# Patient Record
Sex: FEMALE | Race: AMERICAN INDIAN OR ALASKA NATIVE
[De-identification: names, ages, dates, MRNs, and addresses within clinical notes are randomized per-mention and may not be internally consistent; named-entity substitution may affect disease eponyms.]

---

## 2017-08-28 ENCOUNTER — HOSPITAL ENCOUNTER (EMERGENCY)
Dept: HOSPITAL 5 - ED | Age: 22
Discharge: HOME | End: 2017-08-28
Payer: SELF-PAY

## 2017-08-28 VITALS — DIASTOLIC BLOOD PRESSURE: 70 MMHG | SYSTOLIC BLOOD PRESSURE: 122 MMHG

## 2017-08-28 DIAGNOSIS — O26.891: Primary | ICD-10-CM

## 2017-08-28 DIAGNOSIS — Z3A.01: ICD-10-CM

## 2017-08-28 DIAGNOSIS — R10.9: ICD-10-CM

## 2017-08-28 DIAGNOSIS — J45.909: ICD-10-CM

## 2017-08-28 LAB
ALBUMIN SERPL-MCNC: 4 G/DL (ref 3.9–5)
ALBUMIN/GLOB SERPL: 1.2 %
ALP SERPL-CCNC: 41 UNITS/L (ref 35–129)
ALT SERPL-CCNC: 24 UNITS/L (ref 7–56)
ANION GAP SERPL CALC-SCNC: 19 MMOL/L
BACTERIA #/AREA URNS HPF: (no result) /HPF
BASOPHILS NFR BLD AUTO: 0.5 % (ref 0–1.8)
BILIRUB SERPL-MCNC: 0.5 MG/DL (ref 0.1–1.2)
BILIRUB UR QL STRIP: (no result)
BLOOD UR QL VISUAL: (no result)
BUN SERPL-MCNC: 9 MG/DL (ref 7–17)
BUN/CREAT SERPL: 18 %
CALCIUM SERPL-MCNC: 8.7 MG/DL (ref 8.4–10.2)
CHLORIDE SERPL-SCNC: 101.9 MMOL/L (ref 98–107)
CO2 SERPL-SCNC: 21 MMOL/L (ref 22–30)
EOSINOPHIL NFR BLD AUTO: 5.8 % (ref 0–4.3)
GLUCOSE SERPL-MCNC: 109 MG/DL (ref 65–100)
HCT VFR BLD CALC: 39.4 % (ref 30.3–42.9)
HGB BLD-MCNC: 12.7 GM/DL (ref 10.1–14.3)
KETONES UR STRIP-MCNC: (no result) MG/DL
LEUKOCYTE ESTERASE UR QL STRIP: (no result)
MCH RBC QN AUTO: 29 PG (ref 28–32)
MCHC RBC AUTO-ENTMCNC: 32 % (ref 30–34)
MCV RBC AUTO: 91 FL (ref 79–97)
MUCOUS THREADS #/AREA URNS HPF: (no result) /HPF
NITRITE UR QL STRIP: (no result)
PH UR STRIP: 5 [PH] (ref 5–7)
PLATELET # BLD: 246 K/MM3 (ref 140–440)
POTASSIUM SERPL-SCNC: 3.6 MMOL/L (ref 3.6–5)
PROT SERPL-MCNC: 7.4 G/DL (ref 6.3–8.2)
PROT UR STRIP-MCNC: (no result) MG/DL
RBC # BLD AUTO: 4.35 M/MM3 (ref 3.65–5.03)
RBC #/AREA URNS HPF: 4 /HPF (ref 0–6)
SODIUM SERPL-SCNC: 138 MMOL/L (ref 137–145)
UROBILINOGEN UR-MCNC: < 2 MG/DL (ref ?–2)
WBC # BLD AUTO: 6 K/MM3 (ref 4.5–11)
WBC #/AREA URNS HPF: 2 /HPF (ref 0–6)

## 2017-08-28 PROCEDURE — 86900 BLOOD TYPING SEROLOGIC ABO: CPT

## 2017-08-28 PROCEDURE — 85025 COMPLETE CBC W/AUTO DIFF WBC: CPT

## 2017-08-28 PROCEDURE — 86901 BLOOD TYPING SEROLOGIC RH(D): CPT

## 2017-08-28 PROCEDURE — 76817 TRANSVAGINAL US OBSTETRIC: CPT

## 2017-08-28 PROCEDURE — 84702 CHORIONIC GONADOTROPIN TEST: CPT

## 2017-08-28 PROCEDURE — 36415 COLL VENOUS BLD VENIPUNCTURE: CPT

## 2017-08-28 PROCEDURE — 81001 URINALYSIS AUTO W/SCOPE: CPT

## 2017-08-28 PROCEDURE — 76801 OB US < 14 WKS SINGLE FETUS: CPT

## 2017-08-28 PROCEDURE — 80053 COMPREHEN METABOLIC PANEL: CPT

## 2017-08-28 NOTE — EMERGENCY DEPARTMENT REPORT
Chief Complaint: Abdominal Pain


Stated Complaint: UNK WKS PREG/ABD PAIN


Time Seen by Provider: 08/28/17 09:59





- HPI


History of Present Illness: 





PT states she had a positive home pregnancy test 3 weeks ago.  pt reports lower 

abd pain since yesterday 





- ROS


Review of Systems: 





+ abd pain 


- bleeding 





- Exam


Vital Signs: 


 Vital Signs











  08/28/17





  09:55


 


Temperature 99.1 F


 


Pulse Rate 81


 


Respiratory 20





Rate 


 


Blood Pressure 136/76


 


O2 Sat by Pulse 100





Oximetry 











MSE screening note: 


Focused history and physical exam performed.


Due to findings the following was ordered:





labs, us 





ED Disposition for MSE


Condition: Stable


Instructions:  Abdominal Pain (ED)

## 2017-08-28 NOTE — ULTRASOUND REPORT
ULTRASOUND OB LESS THAN 14 WEEKS FETUS

ULTRASOUND OB TRANSVAGINAL



HISTORY: Abdominal and pelvic pain. Beta-hCG level is 696.2.



FINDINGS: Transabdominal and transvaginal imaging was performed. The 

uterus measures 12 x 7 x 8 cm. No uterine mass is identified.



The endometrial stripe measures 16 mm. There appears to be a small 

amount of endometrial fluid. No intrauterine gestational sac or fetal 

heart tones could be demonstrated. The cervix is unremarkable.



The right ovary measures 1.9 x 1.3 x 1.9 cm. No focal abnormality.



The left ovary measures 2.2 x 2.1 x 2.5 cm. The left ovary contains a 

2.1 cm cystic lesion which probably represents a corpus luteum cyst.



IMPRESSION:

No intrauterine pregnancy is visualized at this time. See above.

Probable corpus luteum cyst in the left ovary. Please note that an 

ectopic pregnancy is not entirely excluded at this time. Followup is 

recommended.

## 2017-08-28 NOTE — EMERGENCY DEPARTMENT REPORT
ED Abdominal Pain HPI





- General


Chief Complaint: Abdominal Pain


Stated Complaint: UNK WKS PREG/ABD PAIN


Time Seen by Provider: 17 09:59


Source: patient


Mode of arrival: Ambulatory


Limitations: No Limitations





- History of Present Illness


Initial Comments: 





22-year-old female  here with abdominal cramping.  Patient's last miss her 

period was in early July.  She presents today with some cramping but no vaginal 

bleeding.  No fevers chills nausea vomiting.  She otherwise appears well.


MD Complaint: abdominal pain


-: Gradual


Location: suprapubic


Radiation: none


Migration to: no migration


Severity: moderate


Severity scale (0 -10): 0


Quality: fullness


Consistency: intermittent


Improves With: nothing


Worsens With: nothing


Associated Symptoms: denies: nausea, vomiting, chills, constipation, dysuria





- Related Data


LMP (females 10-50): pregnant


 Home Medications











 Medication  Instructions  Recorded  Confirmed  Last Taken


 


No Known Home Medications [No  16 Unknown





Reported Home Medications]    











 Allergies











Allergy/AdvReac Type Severity Reaction Status Date / Time


 


No Known Allergies Allergy   Verified 16 16:23














ED Review of Systems


ROS: 


Stated complaint: UNK WKS PREG/ABD PAIN


Other details as noted in HPI





Comment: All other systems reviewed and negative


Constitutional: denies: chills, fever


Eyes: denies: eye pain, eye discharge, vision change


ENT: denies: ear pain, throat pain


Respiratory: denies: cough, shortness of breath, wheezing


Cardiovascular: denies: chest pain, palpitations


Endocrine: no symptoms reported


Gastrointestinal: denies: abdominal pain, nausea, diarrhea


Genitourinary: denies: urgency, dysuria, discharge


Musculoskeletal: denies: back pain, joint swelling, arthralgia


Skin: denies: rash, lesions


Neurological: denies: headache, weakness, paresthesias


Psychiatric: denies: anxiety, depression


Hematological/Lymphatic: denies: easy bleeding, easy bruising





ED Past Medical Hx





- Past Medical History


Previous Medical History?: Yes


Hx Hypertension: No


Hx Diabetes: No


Hx Deep Vein Thrombosis: No


Hx Renal Disease: No


Hx Sickle Cell Disease: No


Hx Seizures: No


Hx Asthma: Yes (last attack 10yrs ago)


Hx HIV: No


Additional medical history: Vaginal dleivery x 3





- Surgical History


Past Surgical History?: No





- Family History


Family history: no significant





- Social History


Smoking Status: Never Smoker





- Medications


Home Medications: 


 Home Medications











 Medication  Instructions  Recorded  Confirmed  Last Taken  Type


 


No Known Home Medications [No  16 Unknown History





Reported Home Medications]     














ED Physical Exam





- General


Limitations: No Limitations


General appearance: alert, in no apparent distress, obese





- Head


Head exam: Present: atraumatic, normocephalic





- Eye


Eye exam: Present: normal appearance





- ENT


ENT exam: Present: mucous membranes moist





- Neck


Neck exam: Present: normal inspection





- Respiratory


Respiratory exam: Present: normal lung sounds bilaterally.  Absent: respiratory 

distress, wheezes, rales





- Cardiovascular


Cardiovascular Exam: Present: regular rate, normal rhythm, normal heart sounds.

  Absent: systolic murmur, diastolic murmur, rubs, gallop





- GI/Abdominal


GI/Abdominal exam: Present: soft, normal bowel sounds





- Extremities Exam


Extremities exam: Present: normal inspection





- Back Exam


Back exam: Present: normal inspection





- Neurological Exam


Neurological exam: Present: alert, oriented X3





- Psychiatric


Psychiatric exam: Present: normal affect, normal mood





- Skin


Skin exam: Present: warm, dry, intact, normal color.  Absent: rash





ED Course





 Vital Signs











  17





  09:55 13:40


 


Temperature 99.1 F 98.7 F


 


Pulse Rate 81 78


 


Respiratory 20 18





Rate  


 


Blood Pressure 136/76 


 


Blood Pressure  122/70





[Right]  


 


O2 Sat by Pulse 100 98





Oximetry  














ED Medical Decision Making





- Lab Data


Result diagrams: 


 17 10:02





 17 10:02








 Laboratory Results - last 24 hr











  17





  10:02 10:02 10:02


 


WBC  6.0  


 


RBC  4.35  


 


Hgb  12.7  


 


Hct  39.4  


 


MCV  91  


 


MCH  29  


 


MCHC  32  


 


RDW  16.7 H  


 


Plt Count  246  


 


Lymph % (Auto)  33.2  


 


Mono % (Auto)  7.7 H  


 


Eos % (Auto)  5.8 H  


 


Baso % (Auto)  0.5  


 


Lymph #  2.0  


 


Mono #  0.5  


 


Eos #  0.3  


 


Baso #  0.0  


 


Seg Neutrophils %  52.8  


 


Seg Neutrophils #  3.2  


 


Sodium   138 


 


Potassium   3.6 


 


Chloride   101.9 


 


Carbon Dioxide   21 L 


 


Anion Gap   19 


 


BUN   9 


 


Creatinine   0.5 L 


 


Estimated GFR   > 60 


 


BUN/Creatinine Ratio   18.00 


 


Glucose   109 H 


 


Calcium   8.7 


 


Total Bilirubin   0.50 


 


AST   23 


 


ALT   24 


 


Alkaline Phosphatase   41 


 


Total Protein   7.4 


 


Albumin   4.0 


 


Albumin/Globulin Ratio   1.2 


 


HCG, Quant    696.2 H


 


Urine Color   


 


Urine Turbidity   


 


Urine pH   


 


Ur Specific Gravity   


 


Urine Protein   


 


Urine Glucose (UA)   


 


Urine Ketones   


 


Urine Blood   


 


Urine Nitrite   


 


Urine Bilirubin   


 


Urine Urobilinogen   


 


Ur Leukocyte Esterase   


 


Urine WBC (Auto)   


 


Urine RBC (Auto)   


 


U Epithel Cells (Auto)   


 


Urine Bacteria (Auto)   


 


Urine Mucus   


 


Blood Type   














  17





  10:02 10:14


 


WBC  


 


RBC  


 


Hgb  


 


Hct  


 


MCV  


 


MCH  


 


MCHC  


 


RDW  


 


Plt Count  


 


Lymph % (Auto)  


 


Mono % (Auto)  


 


Eos % (Auto)  


 


Baso % (Auto)  


 


Lymph #  


 


Mono #  


 


Eos #  


 


Baso #  


 


Seg Neutrophils %  


 


Seg Neutrophils #  


 


Sodium  


 


Potassium  


 


Chloride  


 


Carbon Dioxide  


 


Anion Gap  


 


BUN  


 


Creatinine  


 


Estimated GFR  


 


BUN/Creatinine Ratio  


 


Glucose  


 


Calcium  


 


Total Bilirubin  


 


AST  


 


ALT  


 


Alkaline Phosphatase  


 


Total Protein  


 


Albumin  


 


Albumin/Globulin Ratio  


 


HCG, Quant  


 


Urine Color   Yellow


 


Urine Turbidity   Clear


 


Urine pH   5.0


 


Ur Specific Gravity   1.029


 


Urine Protein   <15 mg/dl


 


Urine Glucose (UA)   Neg


 


Urine Ketones   Neg


 


Urine Blood   Sm


 


Urine Nitrite   Neg


 


Urine Bilirubin   Neg


 


Urine Urobilinogen   < 2.0


 


Ur Leukocyte Esterase   Tr


 


Urine WBC (Auto)   2.0


 


Urine RBC (Auto)   4.0


 


U Epithel Cells (Auto)   5.0


 


Urine Bacteria (Auto)   1+


 


Urine Mucus   3+


 


Blood Type  AB POSITIVE 














- Medical Decision Making





22-year-old female here with abdominal cramping.  She is .  She's never 

had an ectopic pregnancy.  Her beta hCG is 696.  Her ultrasound does not show 

an intrauterine pregnancy.  She will need follow-up for repeat ultrasound or 

repeat beta-hCG in 48 hours.  I discussed this with the patient and she is 

comfortable with plan she is to return if she has worsening pain or sudden 

onset of vaginal bleeding.





Portions of this chart were dictated with dictation software.  There may be 

dictation errors contained within this note.


Critical care attestation.: 


If time is entered above; I have spent that time in minutes in the direct care 

of this critically ill patient, excluding procedure time.








ED Disposition


Clinical Impression: 


 Abdominal pain affecting pregnancy





Disposition: DC-01 TO HOME OR SELFCARE


Is pt being admited?: No


Condition: Stable


Instructions:  Abdominal Pain (ED), Pregnancy (ED), Ectopic Pregnancy (ED)


Additional Instructions: 


Please return to the emergency department for repeat ultrasound and repeat beta 

hCG in 48 hours.  If you would prefern he may follow directly with a 

gynecologist.


Referrals: 


PRIMARY CARE,MD [Primary Care Provider] - 3-5 Days


JOSH OLIVER MD [Staff Physician] - ASAP (Follow up in 48 hours)

## 2017-10-09 ENCOUNTER — HOSPITAL ENCOUNTER (EMERGENCY)
Dept: HOSPITAL 5 - ED | Age: 22
LOS: 1 days | Discharge: HOME | End: 2017-10-10
Payer: SELF-PAY

## 2017-10-09 DIAGNOSIS — F17.210: ICD-10-CM

## 2017-10-09 DIAGNOSIS — Z3A.10: ICD-10-CM

## 2017-10-09 DIAGNOSIS — O46.91: Primary | ICD-10-CM

## 2017-10-09 LAB
ALBUMIN SERPL-MCNC: 4 G/DL (ref 3.9–5)
ALBUMIN/GLOB SERPL: 1.2 %
ALP SERPL-CCNC: 35 UNITS/L (ref 35–129)
ALT SERPL-CCNC: 15 UNITS/L (ref 7–56)
ANION GAP SERPL CALC-SCNC: 18 MMOL/L
BASOPHILS NFR BLD AUTO: 0.2 % (ref 0–1.8)
BILIRUB SERPL-MCNC: 0.2 MG/DL (ref 0.1–1.2)
BILIRUB UR QL STRIP: (no result)
BLOOD UR QL VISUAL: (no result)
BUN SERPL-MCNC: 9 MG/DL (ref 7–17)
BUN/CREAT SERPL: 18 %
CALCIUM SERPL-MCNC: 9.3 MG/DL (ref 8.4–10.2)
CHLORIDE SERPL-SCNC: 100.2 MMOL/L (ref 98–107)
CO2 SERPL-SCNC: 20 MMOL/L (ref 22–30)
EOSINOPHIL NFR BLD AUTO: 3.4 % (ref 0–4.3)
GLUCOSE SERPL-MCNC: 108 MG/DL (ref 65–100)
HCT VFR BLD CALC: 37.4 % (ref 30.3–42.9)
HGB BLD-MCNC: 12.6 GM/DL (ref 10.1–14.3)
KETONES UR STRIP-MCNC: (no result) MG/DL
LEUKOCYTE ESTERASE UR QL STRIP: (no result)
LIPASE SERPL-CCNC: 23 UNITS/L (ref 13–60)
MCH RBC QN AUTO: 30 PG (ref 28–32)
MCHC RBC AUTO-ENTMCNC: 34 % (ref 30–34)
MCV RBC AUTO: 90 FL (ref 79–97)
MUCOUS THREADS #/AREA URNS HPF: (no result) /HPF
NITRITE UR QL STRIP: (no result)
PH UR STRIP: 8 [PH] (ref 5–7)
PLATELET # BLD: 240 K/MM3 (ref 140–440)
POTASSIUM SERPL-SCNC: 3.6 MMOL/L (ref 3.6–5)
PROT SERPL-MCNC: 7.3 G/DL (ref 6.3–8.2)
PROT UR STRIP-MCNC: (no result) MG/DL
RBC # BLD AUTO: 4.18 M/MM3 (ref 3.65–5.03)
RBC #/AREA URNS HPF: 2 /HPF (ref 0–6)
SODIUM SERPL-SCNC: 135 MMOL/L (ref 137–145)
UROBILINOGEN UR-MCNC: < 2 MG/DL (ref ?–2)
WBC # BLD AUTO: 8 K/MM3 (ref 4.5–11)
WBC #/AREA URNS HPF: < 1 /HPF (ref 0–6)

## 2017-10-09 PROCEDURE — 81001 URINALYSIS AUTO W/SCOPE: CPT

## 2017-10-09 PROCEDURE — 80053 COMPREHEN METABOLIC PANEL: CPT

## 2017-10-09 PROCEDURE — 36415 COLL VENOUS BLD VENIPUNCTURE: CPT

## 2017-10-09 PROCEDURE — 99284 EMERGENCY DEPT VISIT MOD MDM: CPT

## 2017-10-09 PROCEDURE — 76801 OB US < 14 WKS SINGLE FETUS: CPT

## 2017-10-09 PROCEDURE — 84703 CHORIONIC GONADOTROPIN ASSAY: CPT

## 2017-10-09 PROCEDURE — 83690 ASSAY OF LIPASE: CPT

## 2017-10-09 PROCEDURE — 85025 COMPLETE CBC W/AUTO DIFF WBC: CPT

## 2017-10-09 PROCEDURE — 84702 CHORIONIC GONADOTROPIN TEST: CPT

## 2017-10-09 NOTE — ULTRASOUND REPORT
FINAL REPORT



PROCEDURE:  US OB \T\lt; = 14 WEEKS FETUS



TECHNIQUE:  Real-time transabdominal sonography of the uterus,

placenta, amniotic fluid, adnexa, and fetus was performed with

image documentation. Measurements were obtained to determine

fetal age/size. M-mode Doppler was used to document fetal

heartbeat. CPT 48391 



HISTORY:  ABD CRAMPING 



COMPARISON:  No prior studies are available for comparison.



FINDINGS:  

CRL: 36 mm, which corresponds to a gestational age of: 10 weeks,

3 days. 



Yolk Sac: Normal.



Embryonic Cardiac Activity: 166 beats per minute



Gestational Sac: 2 small crescentic hypoechoic areas are seen

adjacent to the gestational sac, likely related to subchorionic

hemorrhage. One is seen inferiorly which measures 1.1 x 1.7 x 2.2

centimeters. One is seen posteriorly which measures 1.7 x 0.5 x

1.0 centimeters



Amniotic fluid: Normal.



Cervix: Not well-visualized



Right Ovary: Normal.



Left Ovary: 2.3 centimeter complex cyst is present



Estimated delivery date: 05/04/2018



IMPRESSION:  

Single live intrauterine gestation at approximately 10 weeks 3

days. EDC by US 05/04/2018 



Two small areas of subchorionic hemorrhage are seen, as described

above.

## 2017-10-10 VITALS — SYSTOLIC BLOOD PRESSURE: 118 MMHG | DIASTOLIC BLOOD PRESSURE: 68 MMHG

## 2017-10-10 NOTE — EMERGENCY DEPARTMENT REPORT
HPI





- General


Chief Complaint: Abdominal Pain


Time Seen by Provider: 10/10/17 08:04





- HPI


HPI: 





Room 26





The patient is a 22-year-old female presented with a chief complaint of 

abdominal pain.  Patient states she is pregnant but has not yet seen an OB/GYN.

  Patient states for the past 3 days she's had intermittent suprapubic 

abdominal pain.  Patient denies vaginal bleeding or dysuria.  Patient describes 

pain as cramping and intermittent.





Location: Suprapubic


Duration: 3 days


Quality: Cramping


Severity: Moderate


Modifying factors: [see above]


Context: [see above]


Mode of transportation: Unknown





ED Past Medical Hx





- Past Medical History


Hx Asthma: Yes (last attack 10yrs ago)


Additional medical history: Vaginal dleivery x 3





- Surgical History


Past Surgical History?: No





- Family History


Family history: no significant





- Social History


Smoking Status: Current Some Day Smoker


Substance Use Type: None (denies illicit drug use)





- Medications


Home Medications: 


 Home Medications











 Medication  Instructions  Recorded  Confirmed  Last Taken  Type


 


No Known Home Medications [No  16 Unknown History





Reported Home Medications]     














ED Review of Systems


ROS: 


Stated complaint: ABD PAIN 15WKS


Other details as noted in HPI





Comment: All other systems reviewed and negative


Constitutional: denies: chills, fever


Eyes: denies: eye pain, eye discharge, vision change


ENT: denies: ear pain, throat pain


Cardiovascular: denies: chest pain, palpitations


Endocrine: no symptoms reported


Gastrointestinal: abdominal pain


Genitourinary: denies: dysuria, abnormal menses


Musculoskeletal: back pain


Skin: denies: rash, lesions


Neurological: denies: headache, weakness, paresthesias


Psychiatric: denies: anxiety, depression


Hematological/Lymphatic: denies: easy bleeding, easy bruising





Physical Exam





- Physical Exam


Vital Signs: 


 Vital Signs











  10/09/17 10/09/17 10/10/17





  17:28 20:30 08:00


 


Temperature 98.5 F  


 


Pulse Rate 93 H  87


 


Respiratory 18 18 16





Rate   


 


Blood Pressure 123/65  


 


Blood Pressure   118/68





[Left]   


 


O2 Sat by Pulse 99  98





Oximetry   











Physical Exam: 





GENERAL: The patient is well-developed well-nourished female lying on stretcher 

not appearing to be in acute distress. []


HEENT: Normocephalic.  Atraumatic.  Extraocular motions are intact.  Patient 

has moist mucous membranes.


NECK: Supple.  No meningitic signs are noted.  There is no adenopathy noted.


CHEST/LUNGS: Clear to auscultation.  There is no respiratory distress noted.


HEART/CARDIOVASCULAR: Regular.  There is no tachycardia.  There is no gallop 

rub or murmur.


ABDOMEN: Abdomen is soft, with mild discomfort to palpation in the suprapubic 

and left lower quadrant.  There is no tenderness to palpation in the right 

lower quadrant.  There is no rebound or guarding.  Patient has normal bowel 

sounds.  There is no abdominal distention.


SKIN: There is no rash.  There is no edema.  There is no diaphoresis.


NEURO: The patient is awake, alert, and oriented.  The patient is cooperative.  

The patient has normal speech


MUSCULOSKELETAL:   There is no evidence of acute injury.





ED Course


 Vital Signs











  10/09/17 10/09/17 10/10/17





  17:28 20:30 08:00


 


Temperature 98.5 F  


 


Pulse Rate 93 H  87


 


Respiratory 18 18 16





Rate   


 


Blood Pressure 123/65  


 


Blood Pressure   118/68





[Left]   


 


O2 Sat by Pulse 99  98





Oximetry   














ED Medical Decision Making





- Lab Data


Result diagrams: 


 10/09/17 17:40





 10/09/17 17:40





 Laboratory Tests











  10/09/17 10/09/17 10/09/17





  17:40 17:40 17:40


 


WBC  8.0  


 


RBC  4.18  


 


Hgb  12.6  


 


Hct  37.4  


 


MCV  90  


 


MCH  30  


 


MCHC  34  


 


RDW  15.6 H  


 


Plt Count  240  


 


Lymph % (Auto)  27.4  


 


Mono % (Auto)  5.0  


 


Eos % (Auto)  3.4  


 


Baso % (Auto)  0.2  


 


Lymph #  2.2  


 


Mono #  0.4  


 


Eos #  0.3  


 


Baso #  0.0  


 


Seg Neutrophils %  64.0  


 


Seg Neutrophils #  5.1  


 


Sodium   135 L 


 


Potassium   3.6 


 


Chloride   100.2 


 


Carbon Dioxide   20 L 


 


Anion Gap   18 


 


BUN   9 


 


Creatinine   0.5 L 


 


Estimated GFR   > 60 


 


BUN/Creatinine Ratio   18 


 


Glucose   108 H 


 


Calcium   9.3 


 


Total Bilirubin   0.20 


 


AST   14 


 


ALT   15 


 


Alkaline Phosphatase   35 


 


Total Protein   7.3 


 


Albumin   4.0 


 


Albumin/Globulin Ratio   1.2 


 


Lipase   23 


 


HCG, Qual    Positive


 


HCG, Quant   


 


Urine Color   


 


Urine Turbidity   


 


Urine pH   


 


Ur Specific Gravity   


 


Urine Protein   


 


Urine Glucose (UA)   


 


Urine Ketones   


 


Urine Blood   


 


Urine Nitrite   


 


Urine Bilirubin   


 


Urine Urobilinogen   


 


Ur Leukocyte Esterase   


 


Urine WBC (Auto)   


 


Urine RBC (Auto)   


 


U Epithel Cells (Auto)   


 


Urine Mucus   














  10/09/17 10/09/17





  17:40 18:06


 


WBC  


 


RBC  


 


Hgb  


 


Hct  


 


MCV  


 


MCH  


 


MCHC  


 


RDW  


 


Plt Count  


 


Lymph % (Auto)  


 


Mono % (Auto)  


 


Eos % (Auto)  


 


Baso % (Auto)  


 


Lymph #  


 


Mono #  


 


Eos #  


 


Baso #  


 


Seg Neutrophils %  


 


Seg Neutrophils #  


 


Sodium  


 


Potassium  


 


Chloride  


 


Carbon Dioxide  


 


Anion Gap  


 


BUN  


 


Creatinine  


 


Estimated GFR  


 


BUN/Creatinine Ratio  


 


Glucose  


 


Calcium  


 


Total Bilirubin  


 


AST  


 


ALT  


 


Alkaline Phosphatase  


 


Total Protein  


 


Albumin  


 


Albumin/Globulin Ratio  


 


Lipase  


 


HCG, Qual  


 


HCG, Quant  240171 H 


 


Urine Color   Yellow


 


Urine Turbidity   Clear


 


Urine pH   8.0 H


 


Ur Specific Gravity   1.025


 


Urine Protein   <15 mg/dl


 


Urine Glucose (UA)   Neg


 


Urine Ketones   Neg


 


Urine Blood   Neg


 


Urine Nitrite   Neg


 


Urine Bilirubin   Neg


 


Urine Urobilinogen   < 2.0


 


Ur Leukocyte Esterase   Neg


 


Urine WBC (Auto)   < 1.0


 


Urine RBC (Auto)   2.0


 


U Epithel Cells (Auto)   2.0


 


Urine Mucus   Few














- Radiology Data


Radiology results: report reviewed (pelvic ultrasound), image reviewed (pelvic 

ultrasound)





Pelvic ultrasound (read by radiologist)-single live intrauterine gestation at 

approximately 10 weeks 3 days.  2 small areas of subchorionic hemorrhage are 

seen





- Differential Diagnosis


threatened , ectopic pregnancy, UTI


Critical care attestation.: 


If time is entered above; I have spent that time in minutes in the direct care 

of this critically ill patient, excluding procedure time.








ED Disposition


Clinical Impression: 


 Subchorionic hemorrhage in first trimester, Abdominal pain





Disposition:  TO HOME OR SELFCARE


Is pt being admited?: No


Does the pt Need Aspirin: No


Condition: Stable


Instructions:  Abdominal Pain (ED)


Additional Instructions: 


Return to the emergency department immediately should you develop worsening 

symptoms, fever, inability to tolerate food or liquid or any other concerns.


Referrals: 


PRIMARY CARE,MD [Primary Care Provider] - 3-5 Days


your, OB/GYN [Other] - ASAP


Time of Disposition: 08:16

## 2017-12-26 ENCOUNTER — HOSPITAL ENCOUNTER (OUTPATIENT)
Dept: HOSPITAL 5 - TRG | Age: 22
Discharge: HOME | End: 2017-12-26
Attending: OBSTETRICS & GYNECOLOGY
Payer: COMMERCIAL

## 2017-12-26 VITALS — SYSTOLIC BLOOD PRESSURE: 138 MMHG | DIASTOLIC BLOOD PRESSURE: 75 MMHG

## 2017-12-26 DIAGNOSIS — Z3A.23: ICD-10-CM

## 2017-12-26 DIAGNOSIS — Z87.891: ICD-10-CM

## 2017-12-26 DIAGNOSIS — O47.02: Primary | ICD-10-CM

## 2017-12-26 LAB
BILIRUB UR QL STRIP: (no result)
BLOOD UR QL VISUAL: (no result)
KETONES UR STRIP-MCNC: (no result) MG/DL
LEUKOCYTE ESTERASE UR QL STRIP: (no result)
MUCOUS THREADS #/AREA URNS HPF: (no result) /HPF
NITRITE UR QL STRIP: (no result)
PH UR STRIP: 7 [PH] (ref 5–7)
PROT UR STRIP-MCNC: (no result) MG/DL
RBC #/AREA URNS HPF: 2 /HPF (ref 0–6)
UROBILINOGEN UR-MCNC: < 2 MG/DL (ref ?–2)
WBC #/AREA URNS HPF: 1 /HPF (ref 0–6)

## 2017-12-26 PROCEDURE — 81001 URINALYSIS AUTO W/SCOPE: CPT

## 2017-12-26 PROCEDURE — 59025 FETAL NON-STRESS TEST: CPT

## 2017-12-26 PROCEDURE — 96360 HYDRATION IV INFUSION INIT: CPT

## 2017-12-26 PROCEDURE — 96372 THER/PROPH/DIAG INJ SC/IM: CPT

## 2018-03-08 ENCOUNTER — HOSPITAL ENCOUNTER (OUTPATIENT)
Dept: HOSPITAL 5 - TRG | Age: 23
Discharge: HOME | End: 2018-03-08
Attending: OBSTETRICS & GYNECOLOGY
Payer: MEDICAID

## 2018-03-08 VITALS — DIASTOLIC BLOOD PRESSURE: 71 MMHG | SYSTOLIC BLOOD PRESSURE: 129 MMHG

## 2018-03-08 DIAGNOSIS — Z3A.36: ICD-10-CM

## 2018-03-08 DIAGNOSIS — O47.03: Primary | ICD-10-CM

## 2018-03-08 DIAGNOSIS — Z79.899: ICD-10-CM

## 2018-03-08 LAB
BENZODIAZEPINES SCREEN,URINE: (no result)
BILIRUB UR QL STRIP: (no result)
BLOOD UR QL VISUAL: (no result)
METHADONE SCREEN,URINE: (no result)
MUCOUS THREADS #/AREA URNS HPF: (no result) /HPF
OPIATE SCREEN,URINE: (no result)
PH UR STRIP: 6 [PH] (ref 5–7)
PROT UR STRIP-MCNC: (no result) MG/DL
RBC #/AREA URNS HPF: 5 /HPF (ref 0–6)
UROBILINOGEN UR-MCNC: < 2 MG/DL (ref ?–2)
WBC #/AREA URNS HPF: 5 /HPF (ref 0–6)

## 2018-03-08 PROCEDURE — 81001 URINALYSIS AUTO W/SCOPE: CPT

## 2018-03-08 PROCEDURE — 59025 FETAL NON-STRESS TEST: CPT

## 2018-03-08 PROCEDURE — 80307 DRUG TEST PRSMV CHEM ANLYZR: CPT

## 2018-04-14 ENCOUNTER — HOSPITAL ENCOUNTER (OUTPATIENT)
Dept: HOSPITAL 5 - TRG | Age: 23
LOS: 1 days | Discharge: HOME | End: 2018-04-15
Attending: OBSTETRICS & GYNECOLOGY
Payer: MEDICAID

## 2018-04-14 VITALS — SYSTOLIC BLOOD PRESSURE: 112 MMHG | DIASTOLIC BLOOD PRESSURE: 60 MMHG

## 2018-04-14 DIAGNOSIS — O99.333: Primary | ICD-10-CM

## 2018-04-14 DIAGNOSIS — O47.1: ICD-10-CM

## 2018-04-14 DIAGNOSIS — F17.200: ICD-10-CM

## 2018-04-14 DIAGNOSIS — Z3A.39: ICD-10-CM

## 2018-04-14 PROCEDURE — 59025 FETAL NON-STRESS TEST: CPT

## 2018-04-23 ENCOUNTER — HOSPITAL ENCOUNTER (INPATIENT)
Dept: HOSPITAL 5 - TRG | Age: 23
LOS: 2 days | Discharge: HOME | End: 2018-04-25
Attending: OBSTETRICS & GYNECOLOGY | Admitting: OBSTETRICS & GYNECOLOGY
Payer: MEDICAID

## 2018-04-23 DIAGNOSIS — Z3A.40: ICD-10-CM

## 2018-04-23 DIAGNOSIS — J45.909: ICD-10-CM

## 2018-04-23 LAB
HCT VFR BLD CALC: 26.7 % (ref 30.3–42.9)
HCT VFR BLD CALC: 31 % (ref 30.3–42.9)
HGB BLD-MCNC: 10.1 GM/DL (ref 10.1–14.3)
HGB BLD-MCNC: 8.6 GM/DL (ref 10.1–14.3)
MCH RBC QN AUTO: 27 PG (ref 28–32)
MCHC RBC AUTO-ENTMCNC: 33 % (ref 30–34)
MCV RBC AUTO: 83 FL (ref 79–97)
PLATELET # BLD: 230 K/MM3 (ref 140–440)
RBC # BLD AUTO: 3.76 M/MM3 (ref 3.65–5.03)

## 2018-04-23 PROCEDURE — 99211 OFF/OP EST MAY X REQ PHY/QHP: CPT

## 2018-04-23 PROCEDURE — 85027 COMPLETE CBC AUTOMATED: CPT

## 2018-04-23 PROCEDURE — 85018 HEMOGLOBIN: CPT

## 2018-04-23 PROCEDURE — G0463 HOSPITAL OUTPT CLINIC VISIT: HCPCS

## 2018-04-23 PROCEDURE — 3E0R3BZ INTRODUCTION OF ANESTHETIC AGENT INTO SPINAL CANAL, PERCUTANEOUS APPROACH: ICD-10-PCS | Performed by: OBSTETRICS & GYNECOLOGY

## 2018-04-23 PROCEDURE — 86850 RBC ANTIBODY SCREEN: CPT

## 2018-04-23 PROCEDURE — 36415 COLL VENOUS BLD VENIPUNCTURE: CPT

## 2018-04-23 PROCEDURE — 86900 BLOOD TYPING SEROLOGIC ABO: CPT

## 2018-04-23 PROCEDURE — 85014 HEMATOCRIT: CPT

## 2018-04-23 PROCEDURE — 86901 BLOOD TYPING SEROLOGIC RH(D): CPT

## 2018-04-23 PROCEDURE — 00HU33Z INSERTION OF INFUSION DEVICE INTO SPINAL CANAL, PERCUTANEOUS APPROACH: ICD-10-PCS | Performed by: OBSTETRICS & GYNECOLOGY

## 2018-04-23 PROCEDURE — 86592 SYPHILIS TEST NON-TREP QUAL: CPT

## 2018-04-23 PROCEDURE — 99406 BEHAV CHNG SMOKING 3-10 MIN: CPT

## 2018-04-23 RX ADMIN — Medication SCH MLS/HR: at 09:12

## 2018-04-23 RX ADMIN — IBUPROFEN SCH MG: 800 TABLET, FILM COATED ORAL at 16:00

## 2018-04-23 RX ADMIN — Medication SCH MLS/HR: at 10:28

## 2018-04-23 NOTE — HISTORY AND PHYSICAL REPORT
History of Present Illness


Date of examination: 18


Date of admission: 


18 05:47





Chief complaint: 


 Labor at term, care at [facility]





History of present illness: 


  Patient arrived in labor, unknown to this practice.


She reports care at [place] - + GBS test








 Vaginal del 2012- term, 7#14 - denies complications


Vag del 2015 - term, 8#15 - denies complications


Vag del  2017 - term, 7#6 - denies complications





reports medical hx Childhood Asthma


denies surgical hx





denies surgical hx





reports hx +CT








Past History


Past Medical History: asthma


Past Surgical History: no surgical history


GYN History: chlamydia





- Obstetrical History


Expected Date of Delivery: 18


Actual Gestation: 40 Week(s) 4 Day(s) 


: 6


Para: 3


Hx # Term Pregnancies: 3


Number of  Pregnancies: 0


Spontaneous Abortions: 1


Induced : 1


Number of Living Children: 3





Medications and Allergies


 Allergies











Allergy/AdvReac Type Severity Reaction Status Date / Time


 


No Known Allergies Allergy   Verified 18 06:12











 Home Medications











 Medication  Instructions  Recorded  Confirmed  Last Taken  Type


 


Prenatal Vit-Fe Fumar-FA [Prenatal 1 tab PO QDAY 17 2 Days Ago 

History





Vitamin]    ~18 














Review of Systems


All systems: negative





- Physical Exam


Breasts: Positive: normal


Cardiovascular: Regular rate


Lungs: Positive: Clear to auscultation, Normal air movement


Abdomen: Positive: normal appearance, soft, normal bowel sounds


Genitourinary (Female): Positive: normal external genitalia, normal perenium


Vulva: both: normal


Vagina: Positive: normal moisture


Uterus: Positive: normal size, normal contour


Anus/Rectum: Positive: normal perianal skin


Extremities: Positive: normal





- Obstetrical


FHR: auscultation normal, category 1


Uterine Contraction Monitor Mode: External


Cervical Dilatation: 6


Uterine Contraction Pattern: Regular


Uterine Tone Measurement Phase: Contraction


Uterine Contraction Intensity: Moderate





Results


All other labs normal.








Assessment and Plan


 23y/o  @ 40+4 arrived in labor. She received care at the [facility] - 

records request in process.  Patient states she is GBS +. Admission orders in 

EMR. Patient would like epidural, IVF open for bolus. Anticipate . Dr. Swain aware. 








- Patient Problems


(1) GBS (group B Streptococcus carrier), +RV culture, currently pregnant


Current Visit: Yes   Status: Acute   





(2) Active labor at term


Current Visit: Yes   Status: Acute   





(3) 40 weeks gestation of pregnancy


Current Visit: Yes   Status: Acute

## 2018-04-23 NOTE — PROCEDURE NOTE
OB Delivery Note





- Delivery


Date of Delivery: 18


Surgeon: MIKE MEDINA


Estimated blood loss: 300cc





- Vaginal


Delivery presentation: vertex


Delivery position: OP


Delivery induction: none


Delivery augmentation: pitocin


Delivery monitor: external FHT, external uterine


Route of delivery: 


Delivery placenta: spontaneous


Delivery cord: 3 umbilical vessels


Episiotomy: none


Delivery laceration: none


Anesthesia: epidural


Delivery comments: 





Delivery as above. No shoulder dystocia or nuchal cord present. No lacerations 

noted. Infant was placed on maternal abdomen. EBL 300ml Mother and infant 

stable in LDR.





- Infant


  ** A


Apgar at 1 minute: 8


Apgar at 5 minutes: 9


Infant Gender: Female (6lbs 8oz)

## 2018-04-23 NOTE — ANESTHESIA CONSULTATION
Anesthesia Consult and Med Hx


Date of service: 04/23/18





- Airway


Anesthetic Teeth Evaluation: Good


ROM Head & Neck: Adequate


Mental/Hyoid Distance: Adequate


Mallampati Class: Class II


Intubation Access Assessment: Probably Good





- Pre-Operative Health Status


ASA Pre-Surgery Classification: ASA2


Proposed Anesthetic Plan: Epidural, Spinal





- Pulmonary


Hx Asthma: No





- Cardiovascular System


Hx Hypertension: No





- Central Nervous System


Hx Seizures: No


Hx Psychiatric Problems: No





- Endocrine


Hx Renal Disease: No


Hx Hypothyroidism: No


Hx Hyperthyroidism: No





- Hematic


Hx Anemia: No


Hx Sickle Cell Disease: No





- Other Systems


Hx Alcohol Use: Yes (OCCAISIONAL GLASS OF RED WINE)

## 2018-04-24 RX ADMIN — IBUPROFEN SCH MG: 800 TABLET, FILM COATED ORAL at 06:15

## 2018-04-24 RX ADMIN — METHYLERGONOVINE MALEATE SCH MG: 0.2 TABLET ORAL at 22:45

## 2018-04-24 RX ADMIN — IBUPROFEN SCH MG: 800 TABLET, FILM COATED ORAL at 19:08

## 2018-04-24 RX ADMIN — METHYLERGONOVINE MALEATE SCH MG: 0.2 TABLET ORAL at 06:12

## 2018-04-24 RX ADMIN — METHYLERGONOVINE MALEATE SCH MG: 0.2 TABLET ORAL at 15:29

## 2018-04-24 RX ADMIN — IBUPROFEN SCH MG: 800 TABLET, FILM COATED ORAL at 12:27

## 2018-04-24 RX ADMIN — ACETAMINOPHEN PRN MG: 325 TABLET ORAL at 10:26

## 2018-04-24 RX ADMIN — HYDROCODONE BITARTRATE AND ACETAMINOPHEN PRN EACH: 5; 325 TABLET ORAL at 19:06

## 2018-04-24 RX ADMIN — ACETAMINOPHEN PRN MG: 325 TABLET ORAL at 17:14

## 2018-04-24 NOTE — EVENT NOTE
Date: 04/24/18





Called by nursing staff pt c/o bleeding. Order given for methergine IM times 

one dose and po tp start there after. Orders placed in the EMR.

## 2018-04-24 NOTE — DISCHARGE SUMMARY
Providers





- Providers


Date of Admission: 


18 05:47





Date of discharge: 18 (pt agrees to d/c tomorrow)


Attending physician: 


CHERYL SOUZA





Primary care physician: 


STERLING TURNER








Hospitalization


Reason for admission: active labor


Delivery: 


Episiotomy: none


Laceration: none


Incision: normal


Other postpartum procedures: none


Postpartum complications: none


Discharge diagnosis: IUP at term delivered


 baby: female


Hospital course: 


uncomplicated vaginal delivery








Pt desires to f/u with our practice PP. VSS FF below umb Lochia small Doing 

well s/p vag delivery P: d/c tomorrow RTO 6 weeks for PP care





Condition at discharge: Good


Disposition: DC-01 TO HOME OR SELFCARE





- Discharge Diagnoses


(1) Spontaneous vaginal delivery


Status: Acute   Comment: RTO 6 weeks PP care   





Plan





- Discharge Medications


Prescriptions: 


Ibuprofen 800 mg PO Q6HR #30 tablet





- Provider Discharge Summary


Activity: routine, no sex for 6 weeks, no heavy lifting 4 weeks, no strenuous 

exercise


Diet: routine


Instructions: routine


Additional instructions: 


[]  Smoking cessation referral if applicable(refer to patient education folder 

for contact #)


[]  Refer to [facility] Booklet








Call your doctor immediately for:


* Fever > 100.5


* Heavy vaginal bleeding ( >1 pad per hour)


* Severe persistent headache


* Shortness of breath


* Reddened, hot, painful area to leg or breast


* Drainage or odor from incision.





* Keep incision clean and dry at all times and follow doctor's instructions 

regarding bathing/showering











- Follow up plan


Follow up: 


STERLING TURNER MD [Primary Care Provider] - 7 Days


DORCAS CARLSON CNM [Advanced Practice Nurse] - 6 Weeks


(Congratulations!


Please call [phone] to schedule your postpartum visit 6 weeks.


Call with concerns.


[facility]


[address] [facility] [address] [place] [phone])

## 2018-04-24 NOTE — PROGRESS NOTE
Assessment and Plan


Pt A&O X 3 VSS FF below umb Lochia is small with several clots passed on pad. 

RN present giving Methergine during my rounds. H&H  drop r/t blood loss 

from delivery Pt states she is not symptomatic with ambulating. "I just feel 

tired." Stable s/p vag delivery P: continue pathway Complete PO Methergine D/C 

tomorrow if stable.








Subjective





- Subjective


Date of service: 18 (pt states she still has some clots passing )


Principal diagnosis:  18; Day # 1


Patient reports: appetite normal, voiding normally, pain well controlled, 

ambulating normally


[place]: doing well





Objective





- Vital Signs


Latest vital signs: 


 Vital Signs











  Temp Pulse Resp BP BP Pulse Ox


 


 18 06:15    18   


 


 18 01:11  98.1 F  81  20  109/61   98


 


 18 22:41    18   


 


 18 21:55  98.3 F  80  20  128/64   100


 


 18 16:12  98.5 F  69  20   125/67  98


 


 18 11:55  98.0 F  67  16   117/68  99


 


 18 10:27    16   


 


 18 06:44  97.6 F   18   130/69 








 Intake and Output











 18





 14:59 22:59 06:59


 


Intake Total 158.333 360 


 


Output Total 400 1000 


 


Balance -241.667 -640 


 


Intake:   


 


  .333  


 


    PITOCin/NS 20 UNIT/1000ML 158.333  





    DRIP 20 units In 1,000   





    ml @ 125 mls/hr IV AS   





    [facility] Rx#:[id number]   


 


  Oral  360 


 


Output:   


 


  Urine 400 1000 


 


    Void 400 1000 


 


Other:   


 


  Total, Intake Amount  240 


 


  Total, Output Amount 400 400 


 


  Estimated Blood Loss 300  














- Exam


Breasts: Present: normal


Cardiovascular: Present: Regular rate


Lungs: Present: Normal air movement


Abdomen: Present: normal appearance, soft, normal bowel sounds


Uterus: Present: normal, fundal height below umbilicus


Extremities: Present: normal


Deep Tendon Reflex Grade: Normal +2


Incision: Present: normal, dry, intact





- Labs


Labs: 


 Abnormal lab results











  18 Range/Units





  06:15 21:29 


 


Hgb   8.6 L  (10.1-14.3)  gm/dl


 


Hct   26.7 L  (30.3-42.9)  %


 


MCH  27 L   (28-32)  pg


 


RDW  17.9 H   (13.2-15.2)  %

## 2018-04-25 VITALS — SYSTOLIC BLOOD PRESSURE: 95 MMHG | DIASTOLIC BLOOD PRESSURE: 52 MMHG

## 2018-04-25 RX ADMIN — IBUPROFEN SCH MG: 800 TABLET, FILM COATED ORAL at 05:33

## 2018-04-25 RX ADMIN — IBUPROFEN SCH: 800 TABLET, FILM COATED ORAL at 03:11

## 2018-04-25 RX ADMIN — HYDROCODONE BITARTRATE AND ACETAMINOPHEN PRN EACH: 5; 325 TABLET ORAL at 07:25

## 2018-04-25 RX ADMIN — METHYLERGONOVINE MALEATE SCH MG: 0.2 TABLET ORAL at 05:33

## 2019-10-28 ENCOUNTER — HOSPITAL ENCOUNTER (OUTPATIENT)
Dept: HOSPITAL 5 - TRG | Age: 24
Discharge: HOME | End: 2019-10-28
Attending: OBSTETRICS & GYNECOLOGY
Payer: MEDICAID

## 2019-10-28 VITALS — SYSTOLIC BLOOD PRESSURE: 121 MMHG | DIASTOLIC BLOOD PRESSURE: 62 MMHG

## 2019-10-28 DIAGNOSIS — O47.9: Primary | ICD-10-CM

## 2019-10-28 PROCEDURE — 59025 FETAL NON-STRESS TEST: CPT

## 2022-08-05 ENCOUNTER — HOSPITAL ENCOUNTER (EMERGENCY)
Dept: HOSPITAL 5 - ED | Age: 27
LOS: 1 days | Discharge: LEFT BEFORE DISCHARGE | End: 2022-08-06
Payer: MEDICAID

## 2022-08-05 VITALS — DIASTOLIC BLOOD PRESSURE: 80 MMHG | SYSTOLIC BLOOD PRESSURE: 132 MMHG

## 2022-08-05 DIAGNOSIS — R07.9: Primary | ICD-10-CM

## 2022-08-05 DIAGNOSIS — Z53.21: ICD-10-CM

## 2022-08-05 PROCEDURE — 71046 X-RAY EXAM CHEST 2 VIEWS: CPT

## 2022-08-05 PROCEDURE — 93005 ELECTROCARDIOGRAM TRACING: CPT

## 2022-08-05 NOTE — XRAY REPORT
CHEST 2 VIEWS 



INDICATION / CLINICAL INFORMATION: CHEST PAIN.



COMPARISON: None available.



FINDINGS:



SUPPORT DEVICES: None.

HEART / MEDIASTINUM: No significant abnormality. 

LUNGS / PLEURA: No significant pulmonary or pleural abnormality. No pneumothorax. 



ADDITIONAL FINDINGS: No significant additional findings.



IMPRESSION:

1. No acute findings.



Signer Name: James Lopez MD 

Signed: 8/5/2022 8:10 PM

Workstation Name: [id number]

## 2022-08-08 NOTE — ELECTROCARDIOGRAPH REPORT
[facility]

                                       

Test Date:    2022               Test Time:    19:02:37

Pat Name:     ENDY CARDENAS           Department:   

Patient ID:   [id number]          Room:          

Gender:       F                        Technician:   MB

:          1995               Requested By: ED DOC

Order Number: [id number]             Reading MD:   Mario Alberto Palomares

                                 Measurements

Intervals                              Axis          

Rate:         90                       P:            66

IL:           129                      QRS:          69

QRSD:         84                       T:            36

QT:           332                                    

QTc:          406                                    

                           Interpretive Statements

Sinus rhythm

No previous ECG available for comparison

Electronically Signed On 2022 10:09:26 EDT by Mario Alberto Palomares